# Patient Record
Sex: MALE | HISPANIC OR LATINO | Employment: UNEMPLOYED | URBAN - METROPOLITAN AREA
[De-identification: names, ages, dates, MRNs, and addresses within clinical notes are randomized per-mention and may not be internally consistent; named-entity substitution may affect disease eponyms.]

---

## 2024-09-14 ENCOUNTER — HOSPITAL ENCOUNTER (EMERGENCY)
Facility: CLINIC | Age: 39
Discharge: HOME | End: 2024-09-15
Attending: EMERGENCY MEDICINE | Admitting: EMERGENCY MEDICINE

## 2024-09-14 VITALS
OXYGEN SATURATION: 97 % | TEMPERATURE: 99 F | BODY MASS INDEX: 29.62 KG/M2 | WEIGHT: 200 LBS | SYSTOLIC BLOOD PRESSURE: 139 MMHG | HEIGHT: 69 IN | RESPIRATION RATE: 18 BRPM | DIASTOLIC BLOOD PRESSURE: 64 MMHG | HEART RATE: 79 BPM

## 2024-09-14 DIAGNOSIS — S01.01XA SCALP LACERATION, INITIAL ENCOUNTER: ICD-10-CM

## 2024-09-14 DIAGNOSIS — S09.90XA INJURY OF HEAD, INITIAL ENCOUNTER: ICD-10-CM

## 2024-09-14 DIAGNOSIS — Z23 NEED FOR TETANUS BOOSTER: ICD-10-CM

## 2024-09-14 PROCEDURE — 99283 EMERGENCY DEPT VISIT LOW MDM: CPT | Mod: 25

## 2024-09-14 PROCEDURE — 250N000009 HC RX 250: Performed by: EMERGENCY MEDICINE

## 2024-09-14 PROCEDURE — 12002 RPR S/N/AX/GEN/TRNK2.6-7.5CM: CPT

## 2024-09-14 RX ADMIN — Medication 3 ML: at 23:30

## 2024-09-14 ASSESSMENT — ACTIVITIES OF DAILY LIVING (ADL): ADLS_ACUITY_SCORE: 35

## 2024-09-14 ASSESSMENT — COLUMBIA-SUICIDE SEVERITY RATING SCALE - C-SSRS
6. HAVE YOU EVER DONE ANYTHING, STARTED TO DO ANYTHING, OR PREPARED TO DO ANYTHING TO END YOUR LIFE?: NO
2. HAVE YOU ACTUALLY HAD ANY THOUGHTS OF KILLING YOURSELF IN THE PAST MONTH?: NO
1. IN THE PAST MONTH, HAVE YOU WISHED YOU WERE DEAD OR WISHED YOU COULD GO TO SLEEP AND NOT WAKE UP?: NO

## 2024-09-15 PROCEDURE — 90471 IMMUNIZATION ADMIN: CPT | Performed by: EMERGENCY MEDICINE

## 2024-09-15 PROCEDURE — 250N000011 HC RX IP 250 OP 636: Performed by: EMERGENCY MEDICINE

## 2024-09-15 PROCEDURE — 90715 TDAP VACCINE 7 YRS/> IM: CPT | Performed by: EMERGENCY MEDICINE

## 2024-09-15 RX ADMIN — CLOSTRIDIUM TETANI TOXOID ANTIGEN (FORMALDEHYDE INACTIVATED), CORYNEBACTERIUM DIPHTHERIAE TOXOID ANTIGEN (FORMALDEHYDE INACTIVATED), BORDETELLA PERTUSSIS TOXOID ANTIGEN (GLUTARALDEHYDE INACTIVATED), BORDETELLA PERTUSSIS FILAMENTOUS HEMAGGLUTININ ANTIGEN (FORMALDEHYDE INACTIVATED), BORDETELLA PERTUSSIS PERTACTIN ANTIGEN, AND BORDETELLA PERTUSSIS FIMBRIAE 2/3 ANTIGEN 0.5 ML: 5; 2; 2.5; 5; 3; 5 INJECTION, SUSPENSION INTRAMUSCULAR at 00:12

## 2024-09-15 ASSESSMENT — ACTIVITIES OF DAILY LIVING (ADL): ADLS_ACUITY_SCORE: 35

## 2024-09-15 NOTE — ED TRIAGE NOTES
Playing soccer and struck in the head with another players head. Laceration. Unknown last tetanus. No LOC. Occurred around 2145 and has taken tylenol.     Triage Assessment (Adult)       Row Name 09/14/24 9243          Triage Assessment    Airway WDL WDL        Respiratory WDL    Respiratory WDL WDL        Skin Circulation/Temperature WDL    Skin Circulation/Temperature WDL X  laceration to right side of head        Peripheral/Neurovascular WDL    Peripheral Neurovascular WDL WDL        Cognitive/Neuro/Behavioral WDL    Cognitive/Neuro/Behavioral WDL WDL

## 2024-09-15 NOTE — ED PROVIDER NOTES
"  Emergency Department Note      History of Present Illness     Chief Complaint   Head Injury (Playing soccer when he was struck in the head by another players head. Laceration to right side of head. Injury occurred around 2145. Unknown last tetanus. Denies LOC.)      CASS   Rosa Moreau is a 39 year old male who presents to the ED with his wife and a friend for evaluation of a head injury. Patient's friend reports Rosa went up for a header while playing soccer and hit another player head-to-head around 2145 earlier tonight. Denies loss of consciousness. Patient sustained a laceration to the right scalp. No dizziness or nausea. Otherwise feeling fine.  Patient unsure of tetanus status.     Patient's friend served as , per patient preference.     Independent Historian   None    Review of External Notes   Not available and no record in the vaccine registry    Past Medical History     Medical History and Problem List   No pertinent medical history.     Medications   The patient is not currently taking any prescribed medications.     Physical Exam     Patient Vitals for the past 24 hrs:   BP Temp Temp src Pulse Resp SpO2 Height Weight   09/14/24 2249 -- 99  F (37.2  C) Oral -- -- -- -- --   09/14/24 2246 139/64 -- Oral 79 18 97 % 1.75 m (5' 8.9\") 90.7 kg (200 lb)     Physical Exam  Resp:  Non-labored  Neuro:  Alert and cooperative  MSkel:  Moving all extremities  Skin:  Irregular gaping wound R forehead and within hair, muscle not visible, skull not visible, oozing blood, no arterial bleeding      Diagnostics     Lab Results   Labs Ordered and Resulted from Time of ED Arrival to Time of ED Departure - No data to display    Imaging   No orders to display     Independent Interpretation   None    ED Course      Medications Administered   Medications   lido-EPINEPHrine-tetracaine (LET) topical gel GEL (3 mLs Topical $Given 9/14/24 2330)   Tdap (tetanus-diphtheria-acell pertussis) " (ADACEL) injection 0.5 mL (0.5 mLs Intramuscular $Given 9/15/24 0012)       Procedures   Procedures       Laceration Repair      Procedure: Laceration Repair    Indication: Laceration    Consent: Verbal    Tetanus status reviewed    Location: Right Scalp     Length: 6.9 cm    Preparation: Irrigation with Sterile Saline.    Anesthesia/Sedation: Topical -LET and Lidocaine with Epinephrine - 1%      Treatment/Exploration: Wound explored, no foreign bodies found     Closure: The wound was closed with one layer. Skin/superficial layer was closed with 8 x 4-0 Polypropylene (prolene)  using Interrupted sutures.     Patient Status: The patient tolerated the procedure well: Yes. There were no complications.     Discussion of Management   None    ED Course   ED Course as of 09/15/24 0013   Sat Sep 14, 2024   6925 I obtained history and examined the patient as noted above.        Additional Documentation  None    Medical Decision Making / Diagnosis     CMS Diagnoses: None    MIPS       None    MDM   No signs of concussion or traumatic brain injury.  CT not indicated per decision rules.  Tetanus status updated.  Wound repaired as above.  Suture removal in ~7 days as some are on the facial part of the forehead.  Wound care discussed.  Return for worsening signs related to head injury or for signs of wound infection.    Disposition   The patient was discharged.     Diagnosis     ICD-10-CM    1. Scalp laceration, initial encounter  S01.01XA       2. Injury of head, initial encounter  S09.90XA       3. Need for tetanus booster  Z23            Discharge Medications   There are no discharge medications for this patient.        Scribe Disclosure:  Jennifer MCCLURE, am serving as a scribe at 12:08 AM on 9/15/2024 to document services personally performed by Leslie Valerio MD based on my observations and the provider's statements to me.        Leslie Valerio MD  09/18/24 6144